# Patient Record
Sex: FEMALE | Race: WHITE | NOT HISPANIC OR LATINO | Employment: FULL TIME | URBAN - METROPOLITAN AREA
[De-identification: names, ages, dates, MRNs, and addresses within clinical notes are randomized per-mention and may not be internally consistent; named-entity substitution may affect disease eponyms.]

---

## 2018-09-18 ENCOUNTER — APPOINTMENT (OUTPATIENT)
Dept: CT IMAGING | Facility: HOSPITAL | Age: 28
End: 2018-09-18

## 2018-09-18 ENCOUNTER — HOSPITAL ENCOUNTER (EMERGENCY)
Facility: HOSPITAL | Age: 28
Discharge: HOME | End: 2018-09-18
Attending: EMERGENCY MEDICINE | Admitting: EMERGENCY MEDICINE

## 2018-09-18 VITALS
DIASTOLIC BLOOD PRESSURE: 85 MMHG | WEIGHT: 293 LBS | OXYGEN SATURATION: 98 % | TEMPERATURE: 97.7 F | BODY MASS INDEX: 48.82 KG/M2 | RESPIRATION RATE: 18 BRPM | HEIGHT: 65 IN | SYSTOLIC BLOOD PRESSURE: 125 MMHG | HEART RATE: 76 BPM

## 2018-09-18 DIAGNOSIS — N20.0 KIDNEY STONE: Primary | ICD-10-CM

## 2018-09-18 LAB
ALBUMIN SERPL-MCNC: 4.2 G/DL (ref 3.5–5.2)
ALBUMIN/GLOB SERPL: 1.2 G/DL
ALP SERPL-CCNC: 60 U/L (ref 39–117)
ALT SERPL W P-5'-P-CCNC: 16 U/L (ref 1–33)
ANION GAP SERPL CALCULATED.3IONS-SCNC: 10.8 MMOL/L
AST SERPL-CCNC: 11 U/L (ref 1–32)
BACTERIA UR QL AUTO: ABNORMAL /HPF
BASOPHILS # BLD AUTO: 0.03 10*3/MM3 (ref 0–0.2)
BASOPHILS NFR BLD AUTO: 0.2 % (ref 0–1.5)
BILIRUB SERPL-MCNC: 0.3 MG/DL (ref 0.1–1.2)
BILIRUB UR QL STRIP: NEGATIVE
BUN BLD-MCNC: 8 MG/DL (ref 6–20)
BUN/CREAT SERPL: 9.5 (ref 7–25)
CALCIUM SPEC-SCNC: 9.2 MG/DL (ref 8.6–10.5)
CHLORIDE SERPL-SCNC: 104 MMOL/L (ref 98–107)
CLARITY UR: ABNORMAL
CO2 SERPL-SCNC: 23.2 MMOL/L (ref 22–29)
COLOR UR: ABNORMAL
CREAT BLD-MCNC: 0.84 MG/DL (ref 0.57–1)
DEPRECATED RDW RBC AUTO: 42.9 FL (ref 37–54)
EOSINOPHIL # BLD AUTO: 0.06 10*3/MM3 (ref 0–0.7)
EOSINOPHIL NFR BLD AUTO: 0.4 % (ref 0.3–6.2)
ERYTHROCYTE [DISTWIDTH] IN BLOOD BY AUTOMATED COUNT: 14.6 % (ref 11.7–13)
GFR SERPL CREATININE-BSD FRML MDRD: 81 ML/MIN/1.73
GLOBULIN UR ELPH-MCNC: 3.4 GM/DL
GLUCOSE BLD-MCNC: 110 MG/DL (ref 65–99)
GLUCOSE UR STRIP-MCNC: NEGATIVE MG/DL
HCG SERPL QL: NEGATIVE
HCT VFR BLD AUTO: 42.5 % (ref 35.6–45.5)
HGB BLD-MCNC: 13.7 G/DL (ref 11.9–15.5)
HGB UR QL STRIP.AUTO: ABNORMAL
HYALINE CASTS UR QL AUTO: ABNORMAL /LPF
IMM GRANULOCYTES # BLD: 0.03 10*3/MM3 (ref 0–0.03)
IMM GRANULOCYTES NFR BLD: 0.2 % (ref 0–0.5)
KETONES UR QL STRIP: NEGATIVE
LEUKOCYTE ESTERASE UR QL STRIP.AUTO: ABNORMAL
LIPASE SERPL-CCNC: 19 U/L (ref 13–60)
LYMPHOCYTES # BLD AUTO: 3.22 10*3/MM3 (ref 0.9–4.8)
LYMPHOCYTES NFR BLD AUTO: 24.1 % (ref 19.6–45.3)
MCH RBC QN AUTO: 26.2 PG (ref 26.9–32)
MCHC RBC AUTO-ENTMCNC: 32.2 G/DL (ref 32.4–36.3)
MCV RBC AUTO: 81.3 FL (ref 80.5–98.2)
MONOCYTES # BLD AUTO: 0.85 10*3/MM3 (ref 0.2–1.2)
MONOCYTES NFR BLD AUTO: 6.4 % (ref 5–12)
NEUTROPHILS # BLD AUTO: 9.17 10*3/MM3 (ref 1.9–8.1)
NEUTROPHILS NFR BLD AUTO: 68.7 % (ref 42.7–76)
NITRITE UR QL STRIP: POSITIVE
PH UR STRIP.AUTO: 5.5 [PH] (ref 5–8)
PLATELET # BLD AUTO: 343 10*3/MM3 (ref 140–500)
PMV BLD AUTO: 10 FL (ref 6–12)
POTASSIUM BLD-SCNC: 4.1 MMOL/L (ref 3.5–5.2)
PROT SERPL-MCNC: 7.6 G/DL (ref 6–8.5)
PROT UR QL STRIP: ABNORMAL
RBC # BLD AUTO: 5.23 10*6/MM3 (ref 3.9–5.2)
RBC # UR: ABNORMAL /HPF
REF LAB TEST METHOD: ABNORMAL
SODIUM BLD-SCNC: 138 MMOL/L (ref 136–145)
SP GR UR STRIP: >=1.03 (ref 1–1.03)
SQUAMOUS #/AREA URNS HPF: ABNORMAL /HPF
UROBILINOGEN UR QL STRIP: ABNORMAL
WBC NRBC COR # BLD: 13.36 10*3/MM3 (ref 4.5–10.7)
WBC UR QL AUTO: ABNORMAL /HPF

## 2018-09-18 PROCEDURE — 81001 URINALYSIS AUTO W/SCOPE: CPT | Performed by: NURSE PRACTITIONER

## 2018-09-18 PROCEDURE — 96361 HYDRATE IV INFUSION ADD-ON: CPT

## 2018-09-18 PROCEDURE — 25010000002 MORPHINE PER 10 MG: Performed by: NURSE PRACTITIONER

## 2018-09-18 PROCEDURE — 25010000002 ONDANSETRON PER 1 MG: Performed by: NURSE PRACTITIONER

## 2018-09-18 PROCEDURE — 36415 COLL VENOUS BLD VENIPUNCTURE: CPT

## 2018-09-18 PROCEDURE — 80053 COMPREHEN METABOLIC PANEL: CPT | Performed by: NURSE PRACTITIONER

## 2018-09-18 PROCEDURE — 83690 ASSAY OF LIPASE: CPT | Performed by: NURSE PRACTITIONER

## 2018-09-18 PROCEDURE — 25010000002 CEFTRIAXONE PER 250 MG: Performed by: NURSE PRACTITIONER

## 2018-09-18 PROCEDURE — 99283 EMERGENCY DEPT VISIT LOW MDM: CPT

## 2018-09-18 PROCEDURE — 96365 THER/PROPH/DIAG IV INF INIT: CPT

## 2018-09-18 PROCEDURE — 85025 COMPLETE CBC W/AUTO DIFF WBC: CPT | Performed by: NURSE PRACTITIONER

## 2018-09-18 PROCEDURE — 25010000002 KETOROLAC TROMETHAMINE PER 15 MG: Performed by: NURSE PRACTITIONER

## 2018-09-18 PROCEDURE — 96375 TX/PRO/DX INJ NEW DRUG ADDON: CPT

## 2018-09-18 PROCEDURE — 84703 CHORIONIC GONADOTROPIN ASSAY: CPT | Performed by: NURSE PRACTITIONER

## 2018-09-18 PROCEDURE — 74176 CT ABD & PELVIS W/O CONTRAST: CPT

## 2018-09-18 RX ORDER — CEFTRIAXONE SODIUM 1 G/50ML
1 INJECTION, SOLUTION INTRAVENOUS ONCE
Status: COMPLETED | OUTPATIENT
Start: 2018-09-18 | End: 2018-09-18

## 2018-09-18 RX ORDER — ONDANSETRON 2 MG/ML
4 INJECTION INTRAMUSCULAR; INTRAVENOUS ONCE
Status: COMPLETED | OUTPATIENT
Start: 2018-09-18 | End: 2018-09-18

## 2018-09-18 RX ORDER — ONDANSETRON 4 MG/1
4 TABLET, FILM COATED ORAL EVERY 6 HOURS PRN
Qty: 12 TABLET | Refills: 0 | Status: SHIPPED | OUTPATIENT
Start: 2018-09-18

## 2018-09-18 RX ORDER — KETOROLAC TROMETHAMINE 30 MG/ML
30 INJECTION, SOLUTION INTRAMUSCULAR; INTRAVENOUS ONCE
Status: COMPLETED | OUTPATIENT
Start: 2018-09-18 | End: 2018-09-18

## 2018-09-18 RX ORDER — SULFAMETHOXAZOLE AND TRIMETHOPRIM 800; 160 MG/1; MG/1
1 TABLET ORAL 2 TIMES DAILY
Qty: 14 TABLET | Refills: 0 | Status: SHIPPED | OUTPATIENT
Start: 2018-09-18

## 2018-09-18 RX ORDER — HYDROCODONE BITARTRATE AND ACETAMINOPHEN 5; 325 MG/1; MG/1
1 TABLET ORAL EVERY 6 HOURS PRN
Qty: 12 TABLET | Refills: 0 | Status: SHIPPED | OUTPATIENT
Start: 2018-09-18

## 2018-09-18 RX ADMIN — SODIUM CHLORIDE 1000 ML: 9 INJECTION, SOLUTION INTRAVENOUS at 07:30

## 2018-09-18 RX ADMIN — KETOROLAC TROMETHAMINE 30 MG: 30 INJECTION, SOLUTION INTRAMUSCULAR at 08:10

## 2018-09-18 RX ADMIN — ONDANSETRON 4 MG: 2 INJECTION INTRAMUSCULAR; INTRAVENOUS at 07:32

## 2018-09-18 RX ADMIN — MORPHINE SULFATE 4 MG: 4 INJECTION INTRAVENOUS at 07:33

## 2018-09-18 RX ADMIN — CEFTRIAXONE SODIUM 1 G: 1 INJECTION, SOLUTION INTRAVENOUS at 08:10

## 2018-09-18 NOTE — ED PROVIDER NOTES
EMERGENCY DEPARTMENT ENCOUNTER    CHIEF COMPLAINT  Chief Complaint: abdominal pain  History given by:patient  History limited by:nothing  Time Seen: 0635  Room Number: 09/09  PMD: Provider, No Known      HPI:  Pt is a 28 y.o. female who presents with right upper abdominal pain with radiation into back and associated nausea and vomiting that began at approximately 2 AM.  Patient denies fever, diarrhea, urinary concerns, flu or cold symptoms or chances of pregnancy.  Patient denies any sick contacts, recent travel, or recent antibiotic use.  Patient's last meal was last night at approximately 7 PM and consisted of fast food    Duration: today at 2am  Location:[facility]  Radiation:into back  Quality:pain  Intensity/Severity:severe  Progression:persistent  Associated Symptoms:n/v  Aggravating Factors:denies  Alleviating Factors:denies  Previous Episodes:gallbladder removed  Treatment before arrival:nothing    PAST MEDICAL HISTORY  Active Ambulatory Problems     Diagnosis Date Noted   • No Active Ambulatory Problems     Resolved Ambulatory Problems     Diagnosis Date Noted   • No Resolved Ambulatory Problems     No Additional Past Medical History       PAST SURGICAL HISTORY  Past Surgical History:   Procedure Laterality Date   • CHOLECYSTECTOMY         FAMILY HISTORY  History reviewed. No pertinent family history.    SOCIAL HISTORY  Social History     Social History   • Marital status: Single     Spouse name: N/A   • Number of children: N/A   • Years of education: N/A     Occupational History   • Not on file.     Social History Main Topics   • Smoking status: Current Every Day Smoker     Packs/day: 0.50     Years: 5.00   • Smokeless tobacco: Not on file   • Alcohol use No   • Drug use: No   • Sexual activity: Defer     Other Topics Concern   • Not on file     Social History Narrative   • No narrative on file         ALLERGIES  Patient has no known allergies.    REVIEW OF SYSTEMS  Review of Systems   Constitutional: Positive  for diaphoresis. Negative for activity change, appetite change ( decreased), chills, fatigue and fever.   HENT: Negative.  Negative for congestion, ear pain, rhinorrhea and sore throat.    Eyes: Negative.    Respiratory: Negative.  Negative for cough and shortness of breath.    Cardiovascular: Negative.  Negative for chest pain, palpitations and leg swelling ( pedal).   Gastrointestinal: Positive for abdominal pain, nausea and vomiting. Negative for constipation and diarrhea.   Endocrine: Negative.    Genitourinary: Negative.  Negative for decreased urine volume, difficulty urinating, dysuria, menstrual problem, pelvic pain, urgency and vaginal discharge.   Musculoskeletal: Negative.  Negative for back pain.   Skin: Negative.  Negative for rash.   Allergic/Immunologic: Negative.    Neurological: Negative.  Negative for dizziness, weakness, light-headedness, numbness and headaches.   Hematological: Negative.    Psychiatric/Behavioral: Negative.  The patient is not nervous/anxious.    All other systems reviewed and are negative.      PHYSICAL EXAM  ED Triage Vitals [09/18/18 0637]   Temp Heart Rate Resp BP SpO2   97.7 °F (36.5 °C) 96 18 -- 95 %      Temp src Heart Rate Source Patient Position BP Location FiO2 (%)   Tympanic Monitor -- -- --       Physical Exam   Constitutional: She is well-developed, well-nourished, and in no distress. No distress.   HENT:   Head: Normocephalic and atraumatic.   Mouth/Throat: Oropharynx is clear and moist and mucous membranes are normal.   Eyes: Pupils are equal, round, and reactive to light.   Neck: Normal range of motion.   Cardiovascular: Normal rate, regular rhythm and normal heart sounds.    Pulmonary/Chest: Effort normal and breath sounds normal. She has no wheezes.   Abdominal: Soft. Bowel sounds are normal. There is no tenderness.   Musculoskeletal: Normal range of motion. She exhibits no edema.   Neurological: She is alert.   Skin: Skin is warm and dry. No rash noted.    Psychiatric: Mood, memory, affect and judgment normal.   Nursing note and vitals reviewed.      LAB RESULTS  Recent Results (from the past 24 hour(s))   Urinalysis With Microscopic If Indicated (No Culture) - Urine, Clean Catch    Collection Time: 09/18/18  7:02 AM   Result Value Ref Range    Color, UA Red (A) Yellow, Straw    Appearance, UA Turbid (A) Clear    pH, UA 5.5 5.0 - 8.0    Specific Gravity, UA >=1.030 1.005 - 1.030    Glucose, UA Negative Negative    Ketones, UA Negative Negative    Bilirubin, UA Negative Negative    Blood, UA Moderate (2+) (A) Negative    Protein,  mg/dL (2+) (A) Negative    Leuk Esterase, UA Moderate (2+) (A) Negative    Nitrite, UA Positive (A) Negative    Urobilinogen, UA 1.0 E.U./dL 0.2 - 1.0 E.U./dL   Urinalysis, Microscopic Only - Urine, Clean Catch    Collection Time: 09/18/18  7:02 AM   Result Value Ref Range    RBC, UA Too Numerous to Count (A) None Seen, 0-2 /HPF    WBC, UA Unable to determine due to loaded field (A) None Seen, 0-2 /HPF    Bacteria, UA Unable to determine due to loaded field (A) None Seen /HPF    Squamous Epithelial Cells, UA Unable to determine due to loaded field (A) None Seen, 0-2 /HPF    Hyaline Casts, UA Unable to determine due to loaded field None Seen /LPF    Methodology Manual Light Microscopy    Comprehensive Metabolic Panel    Collection Time: 09/18/18  7:07 AM   Result Value Ref Range    Glucose 110 (H) 65 - 99 mg/dL    BUN 8 6 - 20 mg/dL    Creatinine 0.84 0.57 - 1.00 mg/dL    Sodium 138 136 - 145 mmol/L    Potassium 4.1 3.5 - 5.2 mmol/L    Chloride 104 98 - 107 mmol/L    CO2 23.2 22.0 - 29.0 mmol/L    Calcium 9.2 8.6 - 10.5 mg/dL    Total Protein 7.6 6.0 - 8.5 g/dL    Albumin 4.20 3.50 - 5.20 g/dL    ALT (SGPT) 16 1 - 33 U/L    AST (SGOT) 11 1 - 32 U/L    Alkaline Phosphatase 60 39 - 117 U/L    Total Bilirubin 0.3 0.1 - 1.2 mg/dL    eGFR Non African Amer 81 >60 mL/min/1.73    Globulin 3.4 gm/dL    A/G Ratio 1.2 g/dL    BUN/Creatinine Ratio  9.5 7.0 - 25.0    Anion Gap 10.8 mmol/L   Lipase    Collection Time: 09/18/18  7:07 AM   Result Value Ref Range    Lipase 19 13 - 60 U/L   hCG, Serum, Qualitative    Collection Time: 09/18/18  7:07 AM   Result Value Ref Range    HCG Qualitative Negative Indeterminate, Negative   CBC Auto Differential    Collection Time: 09/18/18  7:07 AM   Result Value Ref Range    WBC 13.36 (H) 4.50 - 10.70 10*3/mm3    RBC 5.23 (H) 3.90 - 5.20 10*6/mm3    Hemoglobin 13.7 11.9 - 15.5 g/dL    Hematocrit 42.5 35.6 - 45.5 %    MCV 81.3 80.5 - 98.2 fL    MCH 26.2 (L) 26.9 - 32.0 pg    MCHC 32.2 (L) 32.4 - 36.3 g/dL    RDW 14.6 (H) 11.7 - 13.0 %    RDW-SD 42.9 37.0 - 54.0 fl    MPV 10.0 6.0 - 12.0 fL    Platelets 343 140 - 500 10*3/mm3    Neutrophil % 68.7 42.7 - 76.0 %    Lymphocyte % 24.1 19.6 - 45.3 %    Monocyte % 6.4 5.0 - 12.0 %    Eosinophil % 0.4 0.3 - 6.2 %    Basophil % 0.2 0.0 - 1.5 %    Immature Grans % 0.2 0.0 - 0.5 %    Neutrophils, Absolute 9.17 (H) 1.90 - 8.10 10*3/mm3    Lymphocytes, Absolute 3.22 0.90 - 4.80 10*3/mm3    Monocytes, Absolute 0.85 0.20 - 1.20 10*3/mm3    Eosinophils, Absolute 0.06 0.00 - 0.70 10*3/mm3    Basophils, Absolute 0.03 0.00 - 0.20 10*3/mm3    Immature Grans, Absolute 0.03 0.00 - 0.03 10*3/mm3       I ordered the above labs and reviewed the results    RADIOLOGY  CT Abdomen Pelvis Without Contrast   Final Result       1. Mild right-sided hydronephrosis, secondary to a 4 mm stone located   within the proximal right ureter, immediately distal to the right UPJ.   No stones are identified within the kidneys themselves, and there are no   distal ureteral or bladder stones.       Radiation dose reduction techniques were utilized, including automated   exposure control and exposure modulation based on body size.       This report was finalized on 9/18/2018 9:18 AM by Dr. Jeniffer Reilly M.D.              I ordered the above noted radiological studies and reviewed the images on the PACS system.   "    PROGRESS AND CONSULTS    Reviewed pt's history and workup with Dr. Kuhn.   After a bedside evaluation; Dr Kuhn agrees with the plan of care    0800-Discussed lab results with patient and .  Patient states that her pain and nausea had gone away and now pain is returning approximately a 6 out of 10.  Patient denies history of kidney stones.  Will order a CAT scan and further pain control    0930-patient resting comfortably; denies any pain or nausea currently.  Discussed CAT scan results with patient.  Patient states she would prefer to go home and follow up outpatient with urology    CONSULTS  Time: 1005  Discussed case with Dr. Oquendo  Reviewed history, exam, results and treatments.  Discussed concerns and plan of care.  Dr. Zeng would like to see pt in off Thursday at 8:45    Discussed plan of care with patient and . They agree with plan    COURSE & MEDICAL DECISION MAKING  Pertinent Labs and Imaging studies that were ordered and reviewed are noted above.  Results were reviewed/discussed with the patient and they were also made aware of online assess.   Pt also made aware that some labs, such as cultures, will not be resulted during ER visit and follow up with PMD is necessary.     MEDICATIONS GIVEN IN ER  Medications   sodium chloride 0.9 % bolus 1,000 mL (1,000 mL Intravenous New Bag 9/18/18 0730)   ondansetron (ZOFRAN) injection 4 mg (4 mg Intravenous Given 9/18/18 0732)   morphine injection 4 mg (4 mg Intravenous Given 9/18/18 0733)   ketorolac (TORADOL) injection 30 mg (30 mg Intravenous Given 9/18/18 0810)   cefTRIAXone (ROCEPHIN) IVPB 1 g (0 g Intravenous Stopped 9/18/18 0856)       /91   Pulse 96   Temp 97.7 °F (36.5 °C) (Tympanic)   Resp 18   Ht 165.1 cm (65\")   Wt 135 kg (297 lb 12.8 oz)   SpO2 95%   BMI 49.56 kg/m²     Discussed all results and noted any abnormalities with patient.  Discussed absoute need to recheck abnormalities with " urologist    Reviewed implications of results, diagnosis, meds, responsibility to follow up, warning signs and symptoms of possible worsening, potential complications and reasons to return to ER with patient    Discussed plan for discharge, as there is no emergent indication for admission.  Pt is agreeable and understands need for follow up and repeat testing.  Pt is aware that discharge does not mean that nothing is wrong but it indicates no emergency is present and they must continue care with urologist.  Pt is discharged with instructions to follow up with primary care doctor to have their blood pressure rechecked.       DIAGNOSIS  Final diagnoses:   Kidney stone       FOLLOW UP   Jason Zeng MD  [address] [facility] [address]  [phone]    Go to   you have an appointment on Thursday 9/20/18 at 8:45 am      RX     Medication List      New Prescriptions    HYDROcodone-acetaminophen 5-325 MG per tablet  Commonly known as:  NORCO  Take 1 tablet by mouth Every 6 (Six) Hours As Needed for Severe Pain .     ondansetron 4 MG tablet  Commonly known as:  ZOFRAN  Take 1 tablet by mouth Every 6 (Six) Hours As Needed for Nausea or   Vomiting.     sulfamethoxazole-trimethoprim 800-160 MG per tablet  Commonly known as:  BACTRIM DS,SEPTRA DS  Take 1 tablet by mouth 2 (Two) Times a Day.          Scott report [id number] reviewed.  Risks, benefits, alternatives discussed with patient.  Pt consents to treatment and agrees to follow up with PMD tomorrow for further care and any other prescriptions.     I personally reviewed the past medical history, past surgical history, social history, family history, current medications and allergies as they appear in this chart.  The scribe's note accurately reflects the work and decisions made by me.        Yamel Deleon, APRN  09/18/18 1024

## 2018-09-18 NOTE — ED PROVIDER NOTES
Pt is a 28 y.o. female who presents to the ED complaining of R sided abdominal and R flank pain that started at 0200 this morning. Pt also reports N/V and dysuria. Pt states she is not on her menstrual period. She denies a hx of kidney stones.       On exam,  Constitutional: NAD  Cardiovascular: HRRR  Pulmonary: CTAB  Abdomen: Mild R sided abdominal pain, Mild R CVA tenderness, no rebound, guarding or mass.     Labs reviewed.     Plan: Acquire CT abd/pelvis      MD ATTESTATION NOTE    The MARISOL and I have discussed this patient's history, physical exam, and treatment plan.  I have reviewed the documentation and personally had a face to face interaction with the patient. I affirm the documentation and agree with the treatment and plan.  The attached note describes my personal findings.      Documentation assistance provided by farnaz Jin for Dr. Kuhn. Information recorded by the scribe was done at my direction and has been verified and validated by me.             Damian Jin  09/18/18 0803       Cricket Kuhn MD  09/18/18 0804

## 2018-09-18 NOTE — DISCHARGE INSTRUCTIONS
Pt instructions:  Rest  Drink plenty of fluids  Strain urine  Follow up with Primary Care Doctor for further management and to have your blood pressure rechecked.   Return to ER with pain, swelling, numbness/tingling, fever, chills, weakness, nausea, vomiting, diarrhea, abdominal pain, back pain, urinary concerns, chest pain, shortness of breath, dizziness, headache, worsening of symptoms or other concerns.

## 2018-09-18 NOTE — ED NOTES
Patient presents with abdominal pain, that starts at midline and radiates back to right flank.  Started at 0300 this am.  Patient notes intermittent n/v/d.  Patient denies any blood in stool or burning with urination.  Patient denies any chest pain or shortness of breath.  Breathing is even and unlabored.      Fredrick East RN  09/18/18 9495

## 2021-04-16 ENCOUNTER — BULK ORDERING (OUTPATIENT)
Dept: CASE MANAGEMENT | Facility: OTHER | Age: 31
End: 2021-04-16

## 2021-04-16 DIAGNOSIS — Z23 IMMUNIZATION DUE: ICD-10-CM
